# Patient Record
Sex: FEMALE | Race: WHITE | Employment: UNEMPLOYED | URBAN - METROPOLITAN AREA
[De-identification: names, ages, dates, MRNs, and addresses within clinical notes are randomized per-mention and may not be internally consistent; named-entity substitution may affect disease eponyms.]

---

## 2018-01-15 LAB — PAP SMEAR: NORMAL

## 2018-02-09 ENCOUNTER — OFFICE VISIT (OUTPATIENT)
Dept: FAMILY MEDICINE CLINIC | Age: 23
End: 2018-02-09

## 2018-02-09 VITALS
DIASTOLIC BLOOD PRESSURE: 83 MMHG | RESPIRATION RATE: 20 BRPM | HEIGHT: 62 IN | OXYGEN SATURATION: 99 % | SYSTOLIC BLOOD PRESSURE: 121 MMHG | HEART RATE: 85 BPM | TEMPERATURE: 99.2 F | WEIGHT: 232.2 LBS | BODY MASS INDEX: 42.73 KG/M2

## 2018-02-09 DIAGNOSIS — Z00.00 ROUTINE GENERAL MEDICAL EXAMINATION AT A HEALTH CARE FACILITY: Primary | ICD-10-CM

## 2018-02-09 DIAGNOSIS — R79.89 ELEVATED TSH: ICD-10-CM

## 2018-02-09 DIAGNOSIS — E78.00 HYPERCHOLESTEREMIA: ICD-10-CM

## 2018-02-09 DIAGNOSIS — L98.9 SKIN LESION: ICD-10-CM

## 2018-02-09 DIAGNOSIS — Z11.4 ENCOUNTER FOR SCREENING FOR HIV: ICD-10-CM

## 2018-02-09 DIAGNOSIS — M54.9 UPPER BACK PAIN: ICD-10-CM

## 2018-02-09 DIAGNOSIS — L98.9 SKIN LESION OF CHEST WALL: ICD-10-CM

## 2018-02-09 DIAGNOSIS — M54.2 NECK PAIN: ICD-10-CM

## 2018-02-09 PROCEDURE — 99395 PREV VISIT EST AGE 18-39: CPT | Performed by: FAMILY MEDICINE

## 2018-02-09 ASSESSMENT — PATIENT HEALTH QUESTIONNAIRE - PHQ9
SUM OF ALL RESPONSES TO PHQ QUESTIONS 1-9: 0
1. LITTLE INTEREST OR PLEASURE IN DOING THINGS: 0
SUM OF ALL RESPONSES TO PHQ9 QUESTIONS 1 & 2: 0
2. FEELING DOWN, DEPRESSED OR HOPELESS: 0

## 2018-02-19 ENCOUNTER — HOSPITAL ENCOUNTER (OUTPATIENT)
Dept: GENERAL RADIOLOGY | Age: 23
Discharge: HOME | End: 2018-02-19

## 2018-02-19 LAB
ALBUMIN SERPL-MCNC: 4.4 G/DL (ref 3.4–5)
ANION GAP SERPL CALCULATED.3IONS-SCNC: 16 MMOL/L (ref 3–16)
BUN BLDV-MCNC: 10 MG/DL (ref 7–20)
CALCIUM SERPL-MCNC: 9.8 MG/DL (ref 8.3–10.6)
CHLORIDE BLD-SCNC: 103 MMOL/L (ref 99–110)
CHOLESTEROL, FASTING: 251 MG/DL (ref 0–199)
CO2: 23 MMOL/L (ref 21–32)
CREAT SERPL-MCNC: 0.7 MG/DL (ref 0.6–1.1)
GFR AFRICAN AMERICAN: >60
GFR NON-AFRICAN AMERICAN: >60
GLUCOSE BLD-MCNC: 75 MG/DL (ref 70–99)
HDLC SERPL-MCNC: 43 MG/DL (ref 40–60)
LDL CHOLESTEROL CALCULATED: 174 MG/DL
PHOSPHORUS: 3.3 MG/DL (ref 2.5–4.9)
POTASSIUM SERPL-SCNC: 4.6 MMOL/L (ref 3.5–5.1)
SODIUM BLD-SCNC: 142 MMOL/L (ref 136–145)
TRIGLYCERIDE, FASTING: 170 MG/DL (ref 0–150)
TSH SERPL DL<=0.05 MIU/L-ACNC: 3.03 UIU/ML (ref 0.27–4.2)
VLDLC SERPL CALC-MCNC: 34 MG/DL

## 2018-02-20 LAB
HIV AG/AB: NORMAL
HIV ANTIGEN: NORMAL
HIV-1 ANTIBODY: NORMAL
HIV-2 AB: NORMAL

## 2018-12-13 ENCOUNTER — OFFICE VISIT (OUTPATIENT)
Dept: FAMILY MEDICINE CLINIC | Age: 23
End: 2018-12-13
Payer: COMMERCIAL

## 2018-12-13 VITALS
DIASTOLIC BLOOD PRESSURE: 77 MMHG | TEMPERATURE: 99.9 F | SYSTOLIC BLOOD PRESSURE: 118 MMHG | HEIGHT: 62 IN | OXYGEN SATURATION: 97 % | BODY MASS INDEX: 43.72 KG/M2 | HEART RATE: 66 BPM | WEIGHT: 237.6 LBS

## 2018-12-13 DIAGNOSIS — M54.9 UPPER BACK PAIN: ICD-10-CM

## 2018-12-13 DIAGNOSIS — N62 LARGE BREASTS: Primary | ICD-10-CM

## 2018-12-13 DIAGNOSIS — M54.2 NECK PAIN: ICD-10-CM

## 2018-12-13 PROCEDURE — 99213 OFFICE O/P EST LOW 20 MIN: CPT | Performed by: FAMILY MEDICINE

## 2019-05-20 ENCOUNTER — OFFICE VISIT (OUTPATIENT)
Dept: FAMILY MEDICINE CLINIC | Age: 24
End: 2019-05-20
Payer: COMMERCIAL

## 2019-05-20 VITALS
DIASTOLIC BLOOD PRESSURE: 87 MMHG | OXYGEN SATURATION: 98 % | WEIGHT: 233.6 LBS | BODY MASS INDEX: 42.73 KG/M2 | HEART RATE: 70 BPM | RESPIRATION RATE: 19 BRPM | TEMPERATURE: 99.4 F | SYSTOLIC BLOOD PRESSURE: 124 MMHG

## 2019-05-20 DIAGNOSIS — Z23 NEED FOR HPV VACCINATION: ICD-10-CM

## 2019-05-20 DIAGNOSIS — Z01.818 PREOP EXAMINATION: Primary | ICD-10-CM

## 2019-05-20 DIAGNOSIS — N62 LARGE BREASTS: ICD-10-CM

## 2019-05-20 PROCEDURE — 99242 OFF/OP CONSLTJ NEW/EST SF 20: CPT | Performed by: FAMILY MEDICINE

## 2019-05-20 PROCEDURE — 90649 4VHPV VACCINE 3 DOSE IM: CPT | Performed by: FAMILY MEDICINE

## 2019-05-20 PROCEDURE — 90471 IMMUNIZATION ADMIN: CPT | Performed by: FAMILY MEDICINE

## 2019-05-22 ENCOUNTER — TELEPHONE (OUTPATIENT)
Dept: FAMILY MEDICINE CLINIC | Age: 24
End: 2019-05-22

## 2019-05-24 ENCOUNTER — TELEPHONE (OUTPATIENT)
Dept: FAMILY MEDICINE CLINIC | Age: 24
End: 2019-05-24

## 2019-05-24 NOTE — TELEPHONE ENCOUNTER
Patient's immediate family with no PMHX ,but added grandmother history so something would appear. Please refax/send per below.

## 2019-05-24 NOTE — TELEPHONE ENCOUNTER
Flori Jean from [facility] called [facility] saw Dr. Edward John for her pre-op on 5/20/2019, but there is no family history. [facility]'s guidelines require that there be a family hx included in the pre-op. She would like to know if the note can be addended and faxed to [facility]. Pt's surgery is on 6/6/2019. Please advise. Thanks.           Flori Sanchez [phone] Fax # [phone]

## 2019-12-27 ENCOUNTER — TELEPHONE (OUTPATIENT)
Dept: FAMILY MEDICINE CLINIC | Age: 24
End: 2019-12-27

## 2019-12-27 NOTE — TELEPHONE ENCOUNTER
Pt dropped off [address] to be filled out and signed. Last OV 5/20/19  F/U not scheduled. Last TDAP 3/21/2016  MMR 7/14/99 and 5/31/96. Please advise  If pt needs an appointment prior to signing Form.   Thank you

## 2019-12-31 NOTE — TELEPHONE ENCOUNTER
Tried calling patient twice but the phone would ring and then disconnect. Marking message back to new.

## 2021-01-04 ENCOUNTER — OFFICE VISIT (OUTPATIENT)
Dept: FAMILY MEDICINE CLINIC | Age: 26
End: 2021-01-04
Payer: COMMERCIAL

## 2021-01-04 VITALS
TEMPERATURE: 98.1 F | RESPIRATION RATE: 16 BRPM | WEIGHT: 248.4 LBS | HEART RATE: 85 BPM | OXYGEN SATURATION: 98 % | BODY MASS INDEX: 45.43 KG/M2 | DIASTOLIC BLOOD PRESSURE: 80 MMHG | SYSTOLIC BLOOD PRESSURE: 122 MMHG

## 2021-01-04 DIAGNOSIS — J02.9 ACUTE PHARYNGITIS, UNSPECIFIED ETIOLOGY: Primary | ICD-10-CM

## 2021-01-04 DIAGNOSIS — R53.83 FATIGUE, UNSPECIFIED TYPE: ICD-10-CM

## 2021-01-04 PROCEDURE — 99213 OFFICE O/P EST LOW 20 MIN: CPT | Performed by: FAMILY MEDICINE

## 2021-01-04 PROCEDURE — 87804 INFLUENZA ASSAY W/OPTIC: CPT | Performed by: FAMILY MEDICINE

## 2021-01-04 PROCEDURE — 87880 STREP A ASSAY W/OPTIC: CPT | Performed by: FAMILY MEDICINE

## 2021-01-04 ASSESSMENT — PATIENT HEALTH QUESTIONNAIRE - PHQ9
2. FEELING DOWN, DEPRESSED OR HOPELESS: 0
SUM OF ALL RESPONSES TO PHQ9 QUESTIONS 1 & 2: 0
1. LITTLE INTEREST OR PLEASURE IN DOING THINGS: 0
SUM OF ALL RESPONSES TO PHQ QUESTIONS 1-9: 0

## 2021-01-04 NOTE — PROGRESS NOTES
Patient is here for low Blood sugars on Tuesday , 12/29/2020. She was fatigued a couple days later . Some nasal congestion and sore throat and some ear pain. Headache since 12/29/2020. No nausea. No cough or shortness of breath  . No loss of taste or smell . No fever. No known COVID exposure . Slight hoarseness. She is on yareli break at [facility] [employer]. She is an . Only had a granola     COVID testing phone number: [phone]       Review of Systems    ROS: All other systems were reviewed and are negative . Patient's allergies and medications were reviewed. Patient's past medical, surgical, social , and family history were reviewed. OBJECTIVE:  /80   Pulse 85   Temp 98.1 °F (36.7 °C) (Oral)   Resp 16   Wt 248 lb 6.4 oz (112.7 kg)   LMP 12/16/2020 (Approximate)   SpO2 98%   BMI 45.43 kg/m²     Physical Exam    General: NAD, cooperative, alert and oriented X 3. Mood / affect is good. good insight. well hydrated. HEENT: PERRLA, EOMI, TMs - clear. Nasopharynx clear. Neck : no lymphadenopathy, supple, FROM  CV: Regular rate and rhythm , no murmurs/ rub/ gallop. No edema. Lungs : CTA bilaterally, breathing comfortably  Abdomen: positive bowel sounds, soft , non tender, non distended. No hepatosplenomegaly. No CVA tenderness. Skin: no rashes. Non tender. ASSESSMENT/  PLAN:  1. Acute pharyngitis, unspecified etiology  - Push fluids - water.    - POCT rapid strep A  - POCT Influenza A/B    2. Fatigue, unspecified type  - POCT rapid strep A  - POCT Influenza A/B  - if negative flu and influenza, COVID testing recommended. - Push fluids - water. Get plenty of rest and eat well balanced. If positive, you should self quarantine for 10 days from onset of symptoms and be fever free for > 48 hours, which ever is longer. F/u if no improvement 5-6d/ prn increased symptoms.

## 2021-01-05 ENCOUNTER — TELEPHONE (OUTPATIENT)
Dept: FAMILY MEDICINE CLINIC | Age: 26
End: 2021-01-05

## 2021-01-05 ENCOUNTER — OFFICE VISIT (OUTPATIENT)
Dept: PRIMARY CARE CLINIC | Age: 26
End: 2021-01-05
Payer: COMMERCIAL

## 2021-01-05 DIAGNOSIS — Z20.822 SUSPECTED COVID-19 VIRUS INFECTION: Primary | ICD-10-CM

## 2021-01-05 LAB
INFLUENZA A ANTIBODY: NORMAL
INFLUENZA B ANTIBODY: NORMAL
S PYO AG THROAT QL: NEGATIVE
S PYO AG THROAT QL: NORMAL

## 2021-01-05 PROCEDURE — 99211 OFF/OP EST MAY X REQ PHY/QHP: CPT | Performed by: NURSE PRACTITIONER

## 2021-01-05 NOTE — TELEPHONE ENCOUNTER
Moses Johnston called stating she was in the office to see Dr. Mariah Daniel yesterday and was told to get a Covid 19 test, a flu test, and a strep test.  She called to schedule her tests at [facility] and they told her that her flu and strep tests were not in Epic. The tests were ordered yesterday and are in Epic. She would like to know if they need to be ordered a different way in order for the testing site to see them or if they need to be faxed to them. She wants to make sure there are no issues at her testing appointment at 12:10 today. Please advise. Thanks.         Moses Johnston [phone] (home)

## 2021-01-05 NOTE — PROGRESS NOTES
Chloe GOLDEN Fredrickmarco antonio received a viral test for COVID-19. They were educated on isolation and quarantine as appropriate. For any symptoms, they were directed to seek care from their PCP, given contact information to establish with a doctor, directed to an urgent care or the emergency room.

## 2021-01-06 LAB — SARS-COV-2, NAA: NOT DETECTED

## 2021-01-07 LAB — S PYO THROAT QL CULT: NORMAL

## 2021-02-22 LAB — PAP SMEAR: NORMAL

## 2021-08-25 ENCOUNTER — PATIENT MESSAGE (OUTPATIENT)
Dept: FAMILY MEDICINE CLINIC | Age: 26
End: 2021-08-25

## 2021-08-25 ENCOUNTER — OFFICE VISIT (OUTPATIENT)
Dept: FAMILY MEDICINE CLINIC | Age: 26
End: 2021-08-25
Payer: COMMERCIAL

## 2021-08-25 VITALS
SYSTOLIC BLOOD PRESSURE: 118 MMHG | DIASTOLIC BLOOD PRESSURE: 82 MMHG | RESPIRATION RATE: 12 BRPM | OXYGEN SATURATION: 100 % | TEMPERATURE: 97.8 F | BODY MASS INDEX: 41.81 KG/M2 | WEIGHT: 228.6 LBS | HEART RATE: 82 BPM

## 2021-08-25 DIAGNOSIS — Z11.59 NEED FOR HEPATITIS C SCREENING TEST: ICD-10-CM

## 2021-08-25 DIAGNOSIS — Z00.00 ROUTINE GENERAL MEDICAL EXAMINATION AT A HEALTH CARE FACILITY: Primary | ICD-10-CM

## 2021-08-25 PROCEDURE — 99395 PREV VISIT EST AGE 18-39: CPT | Performed by: FAMILY MEDICINE

## 2021-08-25 SDOH — ECONOMIC STABILITY: FOOD INSECURITY: WITHIN THE PAST 12 MONTHS, YOU WORRIED THAT YOUR FOOD WOULD RUN OUT BEFORE YOU GOT MONEY TO BUY MORE.: NEVER TRUE

## 2021-08-25 SDOH — ECONOMIC STABILITY: FOOD INSECURITY: WITHIN THE PAST 12 MONTHS, THE FOOD YOU BOUGHT JUST DIDN'T LAST AND YOU DIDN'T HAVE MONEY TO GET MORE.: NEVER TRUE

## 2021-08-25 ASSESSMENT — SOCIAL DETERMINANTS OF HEALTH (SDOH): HOW HARD IS IT FOR YOU TO PAY FOR THE VERY BASICS LIKE FOOD, HOUSING, MEDICAL CARE, AND HEATING?: NOT HARD AT ALL

## 2021-08-25 NOTE — PROGRESS NOTES
SUBJECTIVE:   31 y.o. female for annual routine checkup. Current Outpatient Medications   Medication Sig Dispense Refill    norgestrel-ethinyl estradiol (OGESTREL) 0.5-50 MG-MCG per tablet Take 1 tablet by mouth daily       No current facility-administered medications for this visit. Allergies: Penicillins   No LMP recorded. Last PAP: 2/21;1/18- Dr. Genie Sutherland- GYN   History of Abnormal PAP: never   Prior mammogram: never   Colonoscopy 10/13   Sexually active: yes  Self breast exam: occasionally    LMP: 1 week ago.  q 28 days. 3-5 days  . No bleeding between  Smoker: no  Alcohol: no   Caffeine: no  Exercise: M-F - elliptical ; fitness training. ROS:  Feeling well. No dyspnea or chest pain on exertion. No abdominal pain, change in bowel habits, black or bloody stools. No urinary tract symptoms. GYN ROS: normal menses, no abnormal bleeding, pelvic pain or discharge, no breast pain or new or enlarging lumps on self exam. No neurological complaints. See patient physical/  ROS questionnaire. Patient's allergies and medications were reviewed. Patient's past medical, surgical, social , and family history were reviewed. OBJECTIVE:   The patient appears well, alert, oriented x 3, in no distress, cooperative. /82   Pulse 82   Temp 97.8 °F (36.6 °C) (Temporal)   Resp 12   Wt 228 lb 9.6 oz (103.7 kg)   SpO2 100%   BMI 41.81 kg/m²    There were no vitals filed for this visit. HEENT: normocephalic, atraumatic, PERRLA, EOMI, tympanic membranes and nasopharynx are normal.  Neck : supple. No adenopathy or thyromegaly. FROM. Upper extremities : DTRs 2+ biceps/ triceps/ brachioradialis bilateral.  FROM. Strength 5/5. Lungs are clear, good air entry, no wheezes, rhonchi or rales. Breathing comfortably. Cardiovascular: Regular rate  and rhythm. S1 and S2 are normal, no murmurs,rubs, and gallops. No edema. Abdomen is soft without tenderness, guarding, mass or organomegaly.  Normal bowel sounds. Non distended. Back: Cervical, thoracic and lumbar spine exam is normal without tenderness, masses or kyphoscoliosis. Full range of motion without pain is noted. Lower extremities : DTRs 2+ knees and ankles bilateral.  FROM. Strength 5/5. Negative straight leg-raise. No edema or erythema bilateral.  normal peripheral pulses. Neuro: Cranial nerves 2-12 are normal. Deep tendon reflexes are 2+ and equal to all extremities. No focal sensory, or motor deficit noted. Skin: no rashes or suspicious lesions. ASSESSMENT:     1. Routine general medical examination at a health care facility  - Comprehensive Metabolic Panel; Future  - CBC; Future  - Lipid Panel; Future    2. Need for hepatitis C screening test  - Hepatitis C Antibody; Future      PLAN:  Follow a low fat, low cholesterol diet,  continue current healthy lifestyle patterns, including regular cardiovascular exercise >150 minutes per week,  and return for routine annual checkup  Calcium 1200 mg/ day , Vitamin D 400 IU/ day, and weight bearing exercise. Follow up yearly or as needed.

## 2021-08-25 NOTE — TELEPHONE ENCOUNTER
From: Edward Sargent  To: Gokul Middleton MD  Sent: 8/25/2021 10:34 AM EDT  Subject: Non-Urgent Medical Question    I just wanted to let you know that Marciano(my ) said that the life insurance will be sending him a form that needs filled out ( he should receive it in 2-3weeks) so once we receive it I will send to Dr. Tawnya Aguilera for her to fill out to send back to that specific insurance. Thanks!

## 2021-09-02 ENCOUNTER — PATIENT MESSAGE (OUTPATIENT)
Dept: FAMILY MEDICINE CLINIC | Age: 26
End: 2021-09-02

## 2021-09-02 DIAGNOSIS — Z00.00 ROUTINE GENERAL MEDICAL EXAMINATION AT A HEALTH CARE FACILITY: Primary | ICD-10-CM

## 2021-09-02 NOTE — TELEPHONE ENCOUNTER
Patient had surgery in 6/19 and her blood type was likely done then. I did sign order, but am not sure of the cost. (lab would know the charges). If she ever donated blood or had a pregnancy , blood type would have been done then.

## 2021-09-02 NOTE — TELEPHONE ENCOUNTER
From: Jim Sargent  To: Aloysius Kayser, MD  Sent: 9/2/2021 6:55 AM EDT  Subject: Non-Urgent Medical Question    I was told that they keep my blood for a week maybe more so I was wondering if they could test it and notify me what my blood type is. Thanks.

## 2021-09-02 NOTE — TELEPHONE ENCOUNTER
Pt would like an order for blood type. Pt aware not covered by insurance. Yes please I would like a new order added.  And typically how much does the blood type cost ?    Please review lab pending  Thank you

## 2022-01-10 ENCOUNTER — PATIENT MESSAGE (OUTPATIENT)
Dept: FAMILY MEDICINE CLINIC | Age: 27
End: 2022-01-10

## 2022-01-11 NOTE — TELEPHONE ENCOUNTER
From: Nabeel Sargent  To: Dr. Perry Bradshaw: 1/10/2022 5:30 PM EST  Subject: Measles Vaccine    I was wondering if you had a copy of my measles vaccine record. And if so if you could send to me. Thanks!

## 2022-03-18 LAB — PAP SMEAR: NORMAL
